# Patient Record
Sex: MALE | Race: WHITE | NOT HISPANIC OR LATINO | URBAN - METROPOLITAN AREA
[De-identification: names, ages, dates, MRNs, and addresses within clinical notes are randomized per-mention and may not be internally consistent; named-entity substitution may affect disease eponyms.]

---

## 2021-01-15 ENCOUNTER — OFFICE VISIT (OUTPATIENT)
Dept: URBAN - METROPOLITAN AREA TELEHEALTH 2 | Facility: TELEHEALTH | Age: 76
End: 2021-01-15
Payer: MEDICARE

## 2021-01-15 DIAGNOSIS — R19.5 LOOSE STOOLS: ICD-10-CM

## 2021-01-15 DIAGNOSIS — K22.70 BARRETT'S ESOPHAGUS WITHOUT DYSPLASIA: ICD-10-CM

## 2021-01-15 PROCEDURE — G8484 FLU IMMUNIZE NO ADMIN: HCPCS | Performed by: NURSE PRACTITIONER

## 2021-01-15 PROCEDURE — G8417 CALC BMI ABV UP PARAM F/U: HCPCS | Performed by: NURSE PRACTITIONER

## 2021-01-15 PROCEDURE — 1036F TOBACCO NON-USER: CPT | Performed by: NURSE PRACTITIONER

## 2021-01-15 PROCEDURE — 99213 OFFICE O/P EST LOW 20 MIN: CPT | Performed by: NURSE PRACTITIONER

## 2021-01-15 PROCEDURE — G9903 PT SCRN TBCO ID AS NON USER: HCPCS | Performed by: NURSE PRACTITIONER

## 2021-01-15 PROCEDURE — G8427 DOCREV CUR MEDS BY ELIG CLIN: HCPCS | Performed by: NURSE PRACTITIONER

## 2021-01-15 RX ORDER — GABAPENTIN 300 MG/1
2 IN PM CAPSULE ORAL
Qty: 0 | Refills: 0 | Status: ACTIVE | COMMUNITY
Start: 1900-01-01

## 2021-01-15 RX ORDER — ATORVASTATIN CALCIUM 10 MG/1
TAKE 1 TABLET (10 MG) BY ORAL ROUTE ONCE DAILY AT BEDTIME TABLET, FILM COATED ORAL 1
Qty: 0 | Refills: 0 | Status: ACTIVE | COMMUNITY
Start: 1900-01-01

## 2021-01-15 RX ORDER — FLUOXETINE HYDROCHLORIDE 10 MG/1
TAKE 1 TABLET (10 MG) BY ORAL ROUTE ONCE DAILY TABLET ORAL 1
Qty: 0 | Refills: 0 | Status: ACTIVE | COMMUNITY
Start: 1900-01-01

## 2021-01-15 RX ORDER — METRONIDAZOLE 500 MG/1
1 TABLET TABLET, FILM COATED ORAL THREE TIMES A DAY
Qty: 30 TABLETS | Refills: 0 | OUTPATIENT
Start: 2021-01-15 | End: 2021-01-25

## 2021-01-15 RX ORDER — ROPINIROLE 2 MG/1
1 IN AFTERNOON AND 1 IN PM TABLET, FILM COATED ORAL
Qty: 0 | Refills: 0 | Status: ACTIVE | COMMUNITY
Start: 1900-01-01

## 2021-01-15 RX ORDER — SACCHAROMYCES BOULARDII 50 MG
1 CAPSULE CAPSULE ORAL TWICE A DAY
Qty: 60 | OUTPATIENT
Start: 2021-01-15 | End: 2021-02-14

## 2021-01-15 NOTE — HPI-OTHER HISTORIES
1/15/20 Mr. Singh presents via [facility] today with complaints of loose stools for the last one year. He may havae 5-6 bowel movements daily. If has to sit down when he goes to the bathroom to urinate as he often will pass at least a small amount of stool. He has significant urgery. No rectal bleeding or black stools. He occasionally will see some mucous. No new medications or antibiotics. No new supplements. Denies fever/chills. No travel. No sick contacts. He does not alternate with constipation. His wife started hiim on an over the counter probiotic about a month ago but he's noticed no significant improvement. He last had colonoscopy 8/13/2018 for personal history of colon polyps and exam was normal. Recommended to repeat in 5 years.   He does have hx of Barretts and last had EGD 8/13/2018 with small islands of probable Barretts at the GE junction confirmed wit Bx. No dysplasia. He is taking pantoprazole 40mg once daily and has no UGI sx.  TG

## 2021-02-22 ENCOUNTER — LAB OUTSIDE AN ENCOUNTER (OUTPATIENT)
Dept: URBAN - NONMETROPOLITAN AREA CLINIC 2 | Facility: CLINIC | Age: 76
End: 2021-02-22

## 2021-02-22 ENCOUNTER — OFFICE VISIT (OUTPATIENT)
Dept: URBAN - NONMETROPOLITAN AREA CLINIC 2 | Facility: CLINIC | Age: 76
End: 2021-02-22
Payer: MEDICARE

## 2021-02-22 DIAGNOSIS — Z86.010 PERSONAL HISTORY OF COLONIC POLYPS: ICD-10-CM

## 2021-02-22 DIAGNOSIS — R19.5 LOOSE STOOLS: ICD-10-CM

## 2021-02-22 DIAGNOSIS — K22.70 BARRETT'S ESOPHAGUS WITHOUT DYSPLASIA: ICD-10-CM

## 2021-02-22 DIAGNOSIS — R19.7 DIARRHEA, UNSPECIFIED TYPE: ICD-10-CM

## 2021-02-22 PROCEDURE — 99214 OFFICE O/P EST MOD 30 MIN: CPT | Performed by: NURSE PRACTITIONER

## 2021-02-22 RX ORDER — GABAPENTIN 300 MG/1
2 IN PM CAPSULE ORAL
Qty: 0 | Refills: 0 | Status: ACTIVE | COMMUNITY
Start: 1900-01-01

## 2021-02-22 RX ORDER — FLUOXETINE HYDROCHLORIDE 10 MG/1
TAKE 1 TABLET (10 MG) BY ORAL ROUTE ONCE DAILY TABLET ORAL 1
Qty: 0 | Refills: 0 | Status: ACTIVE | COMMUNITY
Start: 1900-01-01

## 2021-02-22 RX ORDER — ROPINIROLE 2 MG/1
1 IN AFTERNOON AND 1 IN PM TABLET, FILM COATED ORAL
Qty: 0 | Refills: 0 | Status: ACTIVE | COMMUNITY
Start: 1900-01-01

## 2021-02-22 RX ORDER — ATORVASTATIN CALCIUM 10 MG/1
TAKE 1 TABLET (10 MG) BY ORAL ROUTE ONCE DAILY AT BEDTIME TABLET, FILM COATED ORAL 1
Qty: 0 | Refills: 0 | Status: ACTIVE | COMMUNITY
Start: 1900-01-01

## 2021-02-22 RX ORDER — SODIUM, POTASSIUM,MAG SULFATES 17.5-3.13G
354ML SOLUTION, RECONSTITUTED, ORAL ORAL
Qty: 354 MILLILITER | Refills: 0 | OUTPATIENT
Start: 2021-02-22 | End: 2021-02-23

## 2021-02-22 NOTE — HPI-OTHER HISTORIES
1/15/20 Mr. Singh presents via [facility] today with complaints of loose stools for the last one year. He may havae 5-6 bowel movements daily. If has to sit down when he goes to the bathroom to urinate as he often will pass at least a small amount of stool. He has significant urgery. No rectal bleeding or black stools. He occasionally will see some mucous. No new medications or antibiotics. No new supplements. Denies fever/chills. No travel. No sick contacts. He does not alternate with constipation. His wife started hiim on an over the counter probiotic about a month ago but he's noticed no significant improvement. He last had colonoscopy 8/13/2018 for personal history of colon polyps and exam was normal. Recommended to repeat in 5 years.   He does have hx of Barretts and last had EGD 8/13/2018 with small islands of probable Barretts at the GE junction confirmed wit Bx. No dysplasia. He is taking pantoprazole 40mg once daily and has no UGI sx.  TG  2/22/21 Mr Singh presents for follow up for loose stools/diarrhea. He did complete the Flagyl but this made no difference in his diarrhea/bloating. He continues with 4-5 loose stools most days. He will take two imodium after 2-3 stools and he may have one more and it will subside until the next day. He passes stool when he sits to urinate. He does have urgency occasionally and has had a few episodes of bowel incontinence which is frustrating. He continues on the probiotics with no improvement. No fever/chills/weight loss. He does have some bloating that is uncomfortable but not painful.  He reflux well controlled with pantoprazole twice daily. No dysphagia. TG

## 2021-03-16 ENCOUNTER — CLAIMS CREATED FROM THE CLAIM WINDOW (OUTPATIENT)
Dept: URBAN - METROPOLITAN AREA CLINIC 4 | Facility: CLINIC | Age: 76
End: 2021-03-16
Payer: MEDICARE

## 2021-03-16 ENCOUNTER — OFFICE VISIT (OUTPATIENT)
Dept: URBAN - NONMETROPOLITAN AREA SURGERY CENTER 1 | Facility: SURGERY CENTER | Age: 76
End: 2021-03-16
Payer: MEDICARE

## 2021-03-16 DIAGNOSIS — K22.70 BARRETT'S ESOPHAGUS WITHOUT DYSPLASIA: ICD-10-CM

## 2021-03-16 DIAGNOSIS — K29.60 OTHER GASTRITIS WITHOUT BLEEDING: ICD-10-CM

## 2021-03-16 DIAGNOSIS — K22.70 BARRETT ESOPHAGUS: ICD-10-CM

## 2021-03-16 DIAGNOSIS — D12.5 BENIGN NEOPLASM OF SIGMOID COLON: ICD-10-CM

## 2021-03-16 DIAGNOSIS — K31.89 ISOLATED IDIOPATHIC GRANULOMA OF STOMACH: ICD-10-CM

## 2021-03-16 DIAGNOSIS — D12.5 ADENOMA OF SIGMOID COLON: ICD-10-CM

## 2021-03-16 DIAGNOSIS — K63.89 MASS OF HEPATIC FLEXURE OF COLON: ICD-10-CM

## 2021-03-16 DIAGNOSIS — K31.89 ACQUIRED DEFORMITY OF DUODENUM: ICD-10-CM

## 2021-03-16 DIAGNOSIS — R19.7 ACUTE DIARRHEA: ICD-10-CM

## 2021-03-16 PROCEDURE — 45385 COLONOSCOPY W/LESION REMOVAL: CPT | Performed by: INTERNAL MEDICINE

## 2021-03-16 PROCEDURE — 43239 EGD BIOPSY SINGLE/MULTIPLE: CPT | Performed by: INTERNAL MEDICINE

## 2021-03-16 PROCEDURE — 88312 SPECIAL STAINS GROUP 1: CPT | Performed by: PATHOLOGY

## 2021-03-16 PROCEDURE — 88305 TISSUE EXAM BY PATHOLOGIST: CPT | Performed by: PATHOLOGY

## 2021-03-16 PROCEDURE — 45380 COLONOSCOPY AND BIOPSY: CPT | Performed by: INTERNAL MEDICINE

## 2021-03-16 PROCEDURE — G8907 PT DOC NO EVENTS ON DISCHARG: HCPCS | Performed by: INTERNAL MEDICINE

## 2021-04-28 ENCOUNTER — OFFICE VISIT (OUTPATIENT)
Dept: URBAN - NONMETROPOLITAN AREA CLINIC 2 | Facility: CLINIC | Age: 76
End: 2021-04-28
Payer: MEDICARE

## 2021-04-28 DIAGNOSIS — K22.70 BARRETT'S ESOPHAGUS WITHOUT DYSPLASIA: ICD-10-CM

## 2021-04-28 DIAGNOSIS — Z86.010 PERSONAL HISTORY OF COLONIC POLYPS: ICD-10-CM

## 2021-04-28 DIAGNOSIS — R19.5 LOOSE STOOLS: ICD-10-CM

## 2021-04-28 DIAGNOSIS — K21.9 GERD WITHOUT ESOPHAGITIS: ICD-10-CM

## 2021-04-28 DIAGNOSIS — R19.7 DIARRHEA, UNSPECIFIED TYPE: ICD-10-CM

## 2021-04-28 PROBLEM — 266435005: Status: ACTIVE | Noted: 2021-04-28

## 2021-04-28 PROCEDURE — 99213 OFFICE O/P EST LOW 20 MIN: CPT | Performed by: NURSE PRACTITIONER

## 2021-04-28 RX ORDER — PANTOPRAZOLE SODIUM 40 MG/1
1 TABLET TWICE DAILY 30 MINUTES BEFORE BREAKFAST AND SUPPER TABLET, DELAYED RELEASE ORAL BID
Qty: 180 TABLETS | Refills: 3

## 2021-04-28 RX ORDER — ROPINIROLE 2 MG/1
1 IN AFTERNOON AND 1 IN PM TABLET, FILM COATED ORAL
Qty: 0 | Refills: 0 | Status: ACTIVE | COMMUNITY
Start: 1900-01-01

## 2021-04-28 RX ORDER — GABAPENTIN 300 MG/1
2 IN PM CAPSULE ORAL
Qty: 0 | Refills: 0 | Status: ACTIVE | COMMUNITY
Start: 1900-01-01

## 2021-04-28 RX ORDER — PANTOPRAZOLE SODIUM 40 MG/1
1 TABLET TWICE DAILY 30 MINUTES BEFORE BREAKFAST AND SUPPER TABLET, DELAYED RELEASE ORAL BID
Status: ACTIVE | COMMUNITY

## 2021-04-28 RX ORDER — FLUOXETINE HYDROCHLORIDE 10 MG/1
TAKE 1 TABLET (10 MG) BY ORAL ROUTE ONCE DAILY TABLET ORAL 1
Qty: 0 | Refills: 0 | Status: ACTIVE | COMMUNITY
Start: 1900-01-01

## 2021-04-28 RX ORDER — ATORVASTATIN CALCIUM 10 MG/1
TAKE 1 TABLET (10 MG) BY ORAL ROUTE ONCE DAILY AT BEDTIME TABLET, FILM COATED ORAL 1
Qty: 0 | Refills: 0 | Status: ACTIVE | COMMUNITY
Start: 1900-01-01

## 2021-04-28 NOTE — HPI-OTHER HISTORIES
1/15/20 Mr. Singh presents via  today with complaints of loose stools for the last one year. He may havae 5-6 bowel movements daily. If has to sit down when he goes to the bathroom to urinate as he often will pass at least a small amount of stool. He has significant urgery. No rectal bleeding or black stools. He occasionally will see some mucous. No new medications or antibiotics. No new supplements. Denies fever/chills. No travel. No sick contacts. He does not alternate with constipation. His wife started hiim on an over the counter probiotic about a month ago but he's noticed no significant improvement. He last had colonoscopy 8/13/2018 for personal history of colon polyps and exam was normal. Recommended to repeat in 5 years.   He does have hx of Barretts and last had EGD 8/13/2018 with small islands of probable Barretts at the GE junction confirmed wit Bx. No dysplasia. He is taking pantoprazole 40mg once daily and has no UGI sx.  TG  2/22/21 Mr Singh presents for follow up for loose stools/diarrhea. He did complete the Flagyl but this made no difference in his diarrhea/bloating. He continues with 4-5 loose stools most days. He will take two imodium after 2-3 stools and he may have one more and it will subside until the next day. He passes stool when he sits to urinate. He does have urgency occasionally and has had a few episodes of bowel incontinence which is frustrating. He continues on the probiotics with no improvement. No fever/chills/weight loss. He does have some bloating that is uncomfortable but not painful.  He reflux well controlled with pantoprazole twice daily. No dysphagia. TG   4/28/2021 Mr. Singh presents for follow-up after EGD and colonoscopy 3/16/2021.  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  Colonoscopy with one small TA in the sigmoid colon removed and otherwise normal to the TI.  Random colon biopsies negative for microscopic colitis.  Recommended to repeat EGD and colonoscopy as needed.  Following the colonoscopy, Mr. Singh irregular stools improved.  He has 1-2 bowel movements daily  That are formed.  No abdominal pain.  He is happy with the improvement. He continues on pantoprazole 40mg twice daily with no breakthrough reflux symptoms. Denies dysphagia. No new complaints today.  TG

## 2022-06-30 ENCOUNTER — TELEPHONE ENCOUNTER (OUTPATIENT)
Dept: URBAN - NONMETROPOLITAN AREA CLINIC 2 | Facility: CLINIC | Age: 77
End: 2022-06-30

## 2022-11-10 ENCOUNTER — OFFICE VISIT (OUTPATIENT)
Dept: URBAN - NONMETROPOLITAN AREA CLINIC 2 | Facility: CLINIC | Age: 77
End: 2022-11-10

## 2022-11-28 ENCOUNTER — OFFICE VISIT (OUTPATIENT)
Dept: URBAN - NONMETROPOLITAN AREA CLINIC 2 | Facility: CLINIC | Age: 77
End: 2022-11-28

## 2023-04-06 ENCOUNTER — OFFICE VISIT (OUTPATIENT)
Dept: URBAN - NONMETROPOLITAN AREA CLINIC 13 | Facility: CLINIC | Age: 78
End: 2023-04-06

## 2023-04-27 ENCOUNTER — OFFICE VISIT (OUTPATIENT)
Dept: URBAN - NONMETROPOLITAN AREA CLINIC 13 | Facility: CLINIC | Age: 78
End: 2023-04-27

## 2023-04-27 RX ORDER — CARVEDILOL 6.25 MG/1
TABLET, FILM COATED ORAL
Qty: 180 TABLET | Status: ACTIVE | COMMUNITY

## 2023-04-27 RX ORDER — AMLODIPINE BESYLATE 5 MG/1
TABLET ORAL
Qty: 90 TABLET | Status: ON HOLD | COMMUNITY

## 2023-04-27 RX ORDER — ATORVASTATIN CALCIUM 10 MG/1
TAKE 1 TABLET (10 MG) BY ORAL ROUTE ONCE DAILY AT BEDTIME TABLET, FILM COATED ORAL 1
Qty: 0 | Refills: 0 | Status: ACTIVE | COMMUNITY
Start: 1900-01-01

## 2023-04-27 RX ORDER — FLUOXETINE HYDROCHLORIDE 10 MG/1
TAKE 1 TABLET (10 MG) BY ORAL ROUTE ONCE DAILY TABLET ORAL 1
Qty: 0 | Refills: 0 | Status: ACTIVE | COMMUNITY
Start: 1900-01-01

## 2023-04-27 RX ORDER — LOSARTAN POTASSIUM 100 MG/1
TABLET, FILM COATED ORAL
Qty: 90 TABLET | Status: ACTIVE | COMMUNITY

## 2023-04-27 RX ORDER — PANTOPRAZOLE SODIUM 40 MG/1
1 TABLET TWICE DAILY 30 MINUTES BEFORE BREAKFAST AND SUPPER TABLET, DELAYED RELEASE ORAL BID
Qty: 180 TABLETS | Refills: 3 | Status: ACTIVE | COMMUNITY

## 2023-04-27 RX ORDER — DONEPEZIL HYDROCHLORIDE 10 MG/1
TABLET, FILM COATED ORAL
Qty: 90 TABLET | Status: ACTIVE | COMMUNITY

## 2023-04-27 RX ORDER — GABAPENTIN 300 MG/1
2 IN PM CAPSULE ORAL
Qty: 0 | Refills: 0 | Status: ACTIVE | COMMUNITY
Start: 1900-01-01

## 2023-04-27 RX ORDER — TAMSULOSIN HYDROCHLORIDE 0.4 MG/1
CAPSULE ORAL
Qty: 30 CAPSULE | Status: ACTIVE | COMMUNITY

## 2023-04-27 RX ORDER — ROPINIROLE 2 MG/1
1 IN AFTERNOON AND 1 IN PM TABLET, FILM COATED ORAL
Qty: 0 | Refills: 0 | Status: ACTIVE | COMMUNITY
Start: 1900-01-01

## 2023-04-27 RX ORDER — IPRATROPIUM BROMIDE 21 UG/1
SPRAY, METERED NASAL
Qty: 30 MILLILITER | Status: ACTIVE | COMMUNITY

## 2023-06-01 ENCOUNTER — LAB OUTSIDE AN ENCOUNTER (OUTPATIENT)
Dept: URBAN - NONMETROPOLITAN AREA CLINIC 13 | Facility: CLINIC | Age: 78
End: 2023-06-01

## 2023-06-01 ENCOUNTER — OFFICE VISIT (OUTPATIENT)
Dept: URBAN - NONMETROPOLITAN AREA CLINIC 13 | Facility: CLINIC | Age: 78
End: 2023-06-01
Payer: MEDICARE

## 2023-06-01 VITALS
SYSTOLIC BLOOD PRESSURE: 117 MMHG | HEIGHT: 71 IN | WEIGHT: 188.8 LBS | OXYGEN SATURATION: 100 % | HEART RATE: 84 BPM | DIASTOLIC BLOOD PRESSURE: 73 MMHG | BODY MASS INDEX: 26.43 KG/M2

## 2023-06-01 DIAGNOSIS — K22.70 BARRETT'S ESOPHAGUS WITHOUT DYSPLASIA: ICD-10-CM

## 2023-06-01 DIAGNOSIS — R15.2 FECAL URGENCY: ICD-10-CM

## 2023-06-01 DIAGNOSIS — R19.7 DIARRHEA, UNSPECIFIED TYPE: ICD-10-CM

## 2023-06-01 DIAGNOSIS — Z86.010 PERSONAL HISTORY OF COLONIC POLYPS: ICD-10-CM

## 2023-06-01 DIAGNOSIS — K64.9 HEMORRHOIDS, UNSPECIFIED HEMORRHOID TYPE: ICD-10-CM

## 2023-06-01 PROBLEM — 142251000119102: Status: ACTIVE | Noted: 2023-06-01

## 2023-06-01 PROCEDURE — 99215 OFFICE O/P EST HI 40 MIN: CPT | Performed by: NURSE PRACTITIONER

## 2023-06-01 RX ORDER — CARVEDILOL 6.25 MG/1
TABLET, FILM COATED ORAL
Qty: 180 TABLET | Status: ACTIVE | COMMUNITY

## 2023-06-01 RX ORDER — TAMSULOSIN HYDROCHLORIDE 0.4 MG/1
CAPSULE ORAL
Qty: 30 CAPSULE | Status: ACTIVE | COMMUNITY

## 2023-06-01 RX ORDER — LOSARTAN POTASSIUM 100 MG/1
TABLET, FILM COATED ORAL
Qty: 90 TABLET | Status: ACTIVE | COMMUNITY

## 2023-06-01 RX ORDER — FLUOXETINE HYDROCHLORIDE 10 MG/1
TAKE 1 TABLET (10 MG) BY ORAL ROUTE ONCE DAILY TABLET ORAL 1
Qty: 0 | Refills: 0 | Status: ACTIVE | COMMUNITY
Start: 1900-01-01

## 2023-06-01 RX ORDER — AMLODIPINE BESYLATE 5 MG/1
TABLET ORAL
Qty: 90 TABLET | Status: ON HOLD | COMMUNITY

## 2023-06-01 RX ORDER — IPRATROPIUM BROMIDE 21 UG/1
SPRAY, METERED NASAL
Qty: 30 MILLILITER | Status: ACTIVE | COMMUNITY

## 2023-06-01 RX ORDER — ATORVASTATIN CALCIUM 10 MG/1
TAKE 1 TABLET (10 MG) BY ORAL ROUTE ONCE DAILY AT BEDTIME TABLET, FILM COATED ORAL 1
Qty: 0 | Refills: 0 | Status: ACTIVE | COMMUNITY
Start: 1900-01-01

## 2023-06-01 RX ORDER — PANTOPRAZOLE SODIUM 40 MG/1
1 TABLET TWICE DAILY 30 MINUTES BEFORE BREAKFAST AND SUPPER TABLET, DELAYED RELEASE ORAL BID
Qty: 180 TABLETS | Refills: 3 | Status: ACTIVE | COMMUNITY

## 2023-06-01 RX ORDER — ROPINIROLE 2 MG/1
1 IN AFTERNOON AND 1 IN PM TABLET, FILM COATED ORAL
Qty: 0 | Refills: 0 | Status: ACTIVE | COMMUNITY
Start: 1900-01-01

## 2023-06-01 RX ORDER — PANTOPRAZOLE SODIUM 40 MG/1
1 TABLET TWICE DAILY 30 MINUTES BEFORE BREAKFAST AND SUPPER TABLET, DELAYED RELEASE ORAL TWICE DAILY
Qty: 180 | Refills: 3

## 2023-06-01 RX ORDER — DONEPEZIL HYDROCHLORIDE 10 MG/1
TABLET, FILM COATED ORAL
Qty: 90 TABLET | Status: ACTIVE | COMMUNITY

## 2023-06-01 RX ORDER — GABAPENTIN 300 MG/1
2 IN PM CAPSULE ORAL
Qty: 0 | Refills: 0 | Status: ON HOLD | COMMUNITY
Start: 1900-01-01

## 2023-06-01 NOTE — HPI-TODAY'S VISIT:
Diego Singh is a 77 year old male who presents today for GI follow-up. Mr. Singh has a history of Barretts esophagus with last EGD in 2021. He is on pantoprazole 40 mg daily. He currently endorses cervical dysphagia with breads, meats, and some liquids. He has had a dilation in the paste. No difficulty swallowing pills.  Mr. Joseph also has a history of colon polyps with last colonoscopy 2018 and recommendation to repeat in 5 years. He has had hemorrhoids for most of his life that result in bright red blood on the toilet paper, but he denies any dark or tarry stools, blood in stool, anemia, or pain with hemorrhoids. Diego reports struggling with fecal incontinence and diarrhea. He has at least one bowel movement every day, but it is often small and runny, and he often passes stool when sitting down to urinate.  He sometimes has 3-5 bowel movements/day. He takes immodium as needed, usually ~ 3x/week. Last colonoscopy 3/2021 with 1 TA removed from sigmoid. Similar symptoms resolved after last colonoscopy. LG.

## 2023-08-04 ENCOUNTER — LAB OUTSIDE AN ENCOUNTER (OUTPATIENT)
Dept: URBAN - NONMETROPOLITAN AREA CLINIC 2 | Facility: CLINIC | Age: 78
End: 2023-08-04

## 2023-08-04 ENCOUNTER — OFFICE VISIT (OUTPATIENT)
Dept: URBAN - METROPOLITAN AREA MEDICAL CENTER 1 | Facility: MEDICAL CENTER | Age: 78
End: 2023-08-04
Payer: MEDICARE

## 2023-08-04 DIAGNOSIS — K22.89 OTHER SPECIFIED DISEASE OF ESOPHAGUS: ICD-10-CM

## 2023-08-04 DIAGNOSIS — R15.9 FECAL INCONTINENCE: ICD-10-CM

## 2023-08-04 DIAGNOSIS — K44.9 DIAPHRAGMATIC HERNIA: ICD-10-CM

## 2023-08-04 DIAGNOSIS — K63.89 OTHER SPECIFIED DISEASES OF INTESTINE: ICD-10-CM

## 2023-08-04 PROCEDURE — 45378 DIAGNOSTIC COLONOSCOPY: CPT | Performed by: INTERNAL MEDICINE

## 2023-08-04 PROCEDURE — 43239 EGD BIOPSY SINGLE/MULTIPLE: CPT | Performed by: INTERNAL MEDICINE

## 2023-08-07 LAB
AP CASE REPORT: (no result)
AP FINAL DIAGNOSIS: (no result)
AP GROSS DESCRIPTION: (no result)
AP MICROSCOPIC DESCRIPTION: (no result)

## 2023-09-11 ENCOUNTER — DASHBOARD ENCOUNTERS (OUTPATIENT)
Age: 78
End: 2023-09-11

## 2023-09-11 ENCOUNTER — OFFICE VISIT (OUTPATIENT)
Dept: URBAN - NONMETROPOLITAN AREA CLINIC 2 | Facility: CLINIC | Age: 78
End: 2023-09-11
Payer: MEDICARE

## 2023-09-11 VITALS
HEIGHT: 71 IN | DIASTOLIC BLOOD PRESSURE: 71 MMHG | SYSTOLIC BLOOD PRESSURE: 137 MMHG | BODY MASS INDEX: 25.2 KG/M2 | WEIGHT: 180 LBS | HEART RATE: 58 BPM

## 2023-09-11 DIAGNOSIS — R19.7 ACUTE DIARRHEA: ICD-10-CM

## 2023-09-11 DIAGNOSIS — K64.9 ACUTE HEMORRHOID: ICD-10-CM

## 2023-09-11 DIAGNOSIS — R15.2 FECAL URGENCY: ICD-10-CM

## 2023-09-11 DIAGNOSIS — R13.19 CERVICAL DYSPHAGIA: ICD-10-CM

## 2023-09-11 DIAGNOSIS — K22.70 BARRETT'S ESOPHAGUS WITHOUT DYSPLASIA: ICD-10-CM

## 2023-09-11 PROBLEM — 428283002: Status: ACTIVE | Noted: 2021-01-15

## 2023-09-11 PROBLEM — 71820002: Status: ACTIVE | Noted: 2023-06-01

## 2023-09-11 PROBLEM — 302914006: Status: ACTIVE | Noted: 2021-01-15

## 2023-09-11 PROBLEM — 70153002: Status: ACTIVE | Noted: 2023-06-01

## 2023-09-11 PROBLEM — 62315008: Status: ACTIVE | Noted: 2023-06-01

## 2023-09-11 PROCEDURE — 99214 OFFICE O/P EST MOD 30 MIN: CPT | Performed by: NURSE PRACTITIONER

## 2023-09-11 RX ORDER — PANTOPRAZOLE SODIUM 40 MG/1
1 TABLET TWICE DAILY 30 MINUTES BEFORE BREAKFAST AND SUPPER TABLET, DELAYED RELEASE ORAL TWICE DAILY
Qty: 180 | Refills: 3 | Status: ACTIVE | COMMUNITY

## 2023-09-11 RX ORDER — LOSARTAN POTASSIUM 100 MG/1
TABLET, FILM COATED ORAL
Qty: 90 TABLET | Status: ACTIVE | COMMUNITY

## 2023-09-11 RX ORDER — PANTOPRAZOLE SODIUM 40 MG/1
1 TABLET TWICE DAILY 30 MINUTES BEFORE BREAKFAST AND SUPPER TABLET, DELAYED RELEASE ORAL TWICE DAILY
Qty: 180 | Refills: 3

## 2023-09-11 RX ORDER — IPRATROPIUM BROMIDE 21 UG/1
SPRAY, METERED NASAL
Qty: 30 MILLILITER | Status: ACTIVE | COMMUNITY

## 2023-09-11 RX ORDER — DONEPEZIL HYDROCHLORIDE 10 MG/1
TABLET, FILM COATED ORAL
Qty: 90 TABLET | Status: ACTIVE | COMMUNITY

## 2023-09-11 RX ORDER — ATORVASTATIN CALCIUM 10 MG/1
TAKE 1 TABLET (10 MG) BY ORAL ROUTE ONCE DAILY AT BEDTIME TABLET, FILM COATED ORAL 1
Qty: 0 | Refills: 0 | Status: ACTIVE | COMMUNITY
Start: 1900-01-01

## 2023-09-11 RX ORDER — GABAPENTIN 300 MG/1
2 IN PM CAPSULE ORAL
Qty: 0 | Refills: 0 | Status: ON HOLD | COMMUNITY
Start: 1900-01-01

## 2023-09-11 RX ORDER — FLUOXETINE HYDROCHLORIDE 10 MG/1
TAKE 1 TABLET (10 MG) BY ORAL ROUTE ONCE DAILY TABLET ORAL 1
Qty: 0 | Refills: 0 | Status: ACTIVE | COMMUNITY
Start: 1900-01-01

## 2023-09-11 RX ORDER — CARVEDILOL 6.25 MG/1
TABLET, FILM COATED ORAL
Qty: 180 TABLET | Status: ACTIVE | COMMUNITY

## 2023-09-11 RX ORDER — COLESTIPOL HYDROCHLORIDE 1 G/1
2 TABLETS TABLET, FILM COATED ORAL ONCE A DAY
Qty: 60 | Refills: 5 | OUTPATIENT
Start: 2023-09-11

## 2023-09-11 RX ORDER — AMLODIPINE BESYLATE 5 MG/1
TABLET ORAL
Qty: 90 TABLET | Status: ON HOLD | COMMUNITY

## 2023-09-11 RX ORDER — TAMSULOSIN HYDROCHLORIDE 0.4 MG/1
CAPSULE ORAL
Qty: 30 CAPSULE | Status: ACTIVE | COMMUNITY

## 2023-09-11 RX ORDER — ROPINIROLE 2 MG/1
1 IN AFTERNOON AND 1 IN PM TABLET, FILM COATED ORAL
Qty: 0 | Refills: 0 | Status: ACTIVE | COMMUNITY
Start: 1900-01-01

## 2023-09-11 NOTE — HPI-TODAY'S VISIT:
Mr. Diego Singh returns today for GI follow-up.  He underwent upper endoscopy with biopsies negative for dysplasia or Benites's.  He continues pantoprazole 40 mg twice daily.  He has not noticed any improvement of his cervical dysphagia since upper endoscopy.  He is hesitant to pursue modified barium swallows he feels this is unchanged over the last several years.  He practices swallowing with his chin down which helps to avoid choking and regurgitation.  He also underwent colonoscopy where hemorrhoids and decreased perianal tone were noted.  No polyps were found.  No further screening colons are recommended.  Mr. Singh denies any consistent bleeding.  He continues to take Imodium and psyllium husk every day but still experiences some incontinence.  We discussed starting colestipol may consider Xifaxan at follow-up.  GLENN.

## 2023-09-11 NOTE — HPI-OTHER HISTORIES
1/15/20 Mr. Singh presents via  today with complaints of loose stools for the last one year. He may havae 5-6 bowel movements daily. If has to sit down when he goes to the bathroom to urinate as he often will pass at least a small amount of stool. He has significant urgery. No rectal bleeding or black stools. He occasionally will see some mucous. No new medications or antibiotics. No new supplements. Denies fever/chills. No travel. No sick contacts. He does not alternate with constipation. His wife started hiim on an over the counter probiotic about a month ago but he's noticed no significant improvement. He last had colonoscopy 8/13/2018 for personal history of colon polyps and exam was normal. Recommended to repeat in 5 years. He does have hx of Barretts and last had EGD 8/13/2018 with small islands of probable Barretts at the GE junction confirmed wit Bx. No dysplasia. He is taking pantoprazole 40mg once daily and has no UGI sx. TG  2/22/21 Mr Singh presents for follow up for loose stools/diarrhea. He did complete the Flagyl but this made no difference in his diarrhea/bloating. He continues with 4-5 loose stools most days. He will take two imodium after 2-3 stools and he may have one more and it will subside until the next day. He passes stool when he sits to urinate. He does have urgency occasionally and has had a few episodes of bowel incontinence which is frustrating. He continues on the probiotics with no improvement. No fever/chills/weight loss. He does have some bloating that is uncomfortable but not painful. He reflux well controlled with pantoprazole twice daily. No dysphagia. TG  4/28/2021 Mr. Singh presents for follow-up after EGD and colonoscopy 3/16/2021. 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 Colonoscopy with one small TA in the sigmoid colon removed and otherwise normal to the TI. Random colon biopsies negative for microscopic colitis. Recommended to repeat EGD and colonoscopy as needed. Following the colonoscopy, Mr. Singh irregular stools improved. He has 1-2 bowel movements daily That are formed. No abdominal pain. He is happy with the improvement. He continues on pantoprazole 40mg twice daily with no breakthrough reflux symptoms. Denies dysphagia. No new complaints today. TG 6/2023:  Diego Singh is a 77 year old male who presents today for GI follow-up. Mr. Singh has a history of Barretts esophagus with last EGD in 2021. He is on pantoprazole 40 mg daily. He currently endorses cervical dysphagia with breads, meats, and some liquids. He has had a dilation in the paste. No difficulty swallowing pills. Mr. Singh also has a history of colon polyps with last colonoscopy 2018 and recommendation to repeat in 5 years. He has had hemorrhoids for most of his life that result in bright red blood on the toilet paper, but he denies any dark or tarry stools, blood in stool, anemia, or pain with hemorrhoids. Diego reports struggling with fecal incontinence and diarrhea. He has at least one bowel movement every day, but it is often small and runny, and he often passes stool when sitting down to urinate. He sometimes has 3-5 bowel movements/day. He takes immodium as needed, usually ~ 3x/week. Last colonoscopy 3/2021 with 1 TA removed from sigmoid. Similar symptoms resolved after last colonoscopy. LG.

## 2023-12-04 ENCOUNTER — CLAIMS CREATED FROM THE CLAIM WINDOW (OUTPATIENT)
Dept: URBAN - METROPOLITAN AREA MEDICAL CENTER 1 | Facility: MEDICAL CENTER | Age: 78
End: 2023-12-04
Payer: MEDICARE

## 2023-12-04 DIAGNOSIS — K62.5 ANAL BLEEDING: ICD-10-CM

## 2023-12-04 PROCEDURE — 99232 SBSQ HOSP IP/OBS MODERATE 35: CPT

## 2024-01-29 ENCOUNTER — OFFICE VISIT (OUTPATIENT)
Dept: URBAN - NONMETROPOLITAN AREA CLINIC 2 | Facility: CLINIC | Age: 79
End: 2024-01-29

## 2024-03-11 ENCOUNTER — OV EP (OUTPATIENT)
Dept: URBAN - NONMETROPOLITAN AREA CLINIC 2 | Facility: CLINIC | Age: 79
End: 2024-03-11

## 2025-07-25 ENCOUNTER — OFFICE VISIT (OUTPATIENT)
Dept: URBAN - NONMETROPOLITAN AREA CLINIC 2 | Facility: CLINIC | Age: 80
End: 2025-07-25
Payer: MEDICARE

## 2025-07-25 DIAGNOSIS — I95.9 HYPOTENSION, UNSPECIFIED HYPOTENSION TYPE: ICD-10-CM

## 2025-07-25 DIAGNOSIS — D64.9 ANEMIA, UNSPECIFIED TYPE: ICD-10-CM

## 2025-07-25 DIAGNOSIS — K22.70 BARRETT'S ESOPHAGUS WITHOUT DYSPLASIA: ICD-10-CM

## 2025-07-25 DIAGNOSIS — K64.9 HEMORRHOIDS, UNSPECIFIED HEMORRHOID TYPE: ICD-10-CM

## 2025-07-25 DIAGNOSIS — Z86.0101 PERSONAL HISTORY OF ADENOMATOUS AND SERRATED COLON POLYPS: ICD-10-CM

## 2025-07-25 DIAGNOSIS — R15.2 FECAL URGENCY: ICD-10-CM

## 2025-07-25 DIAGNOSIS — K21.9 GERD WITHOUT ESOPHAGITIS: ICD-10-CM

## 2025-07-25 DIAGNOSIS — R19.7 DIARRHEA, UNSPECIFIED TYPE: ICD-10-CM

## 2025-07-25 DIAGNOSIS — K59.00 CONSTIPATION, UNSPECIFIED CONSTIPATION TYPE: ICD-10-CM

## 2025-07-25 PROCEDURE — 99214 OFFICE O/P EST MOD 30 MIN: CPT

## 2025-07-25 RX ORDER — AMLODIPINE BESYLATE 5 MG/1
TABLET ORAL
Qty: 90 TABLET | Status: ON HOLD | COMMUNITY

## 2025-07-25 RX ORDER — IPRATROPIUM BROMIDE 21 UG/1
SPRAY, METERED NASAL
Qty: 30 MILLILITER | Status: ACTIVE | COMMUNITY

## 2025-07-25 RX ORDER — ROPINIROLE 2 MG/1
1 IN AFTERNOON AND 1 IN PM TABLET, FILM COATED ORAL
Qty: 0 | Refills: 0 | Status: ACTIVE | COMMUNITY
Start: 1900-01-01

## 2025-07-25 RX ORDER — TAMSULOSIN HYDROCHLORIDE 0.4 MG/1
CAPSULE ORAL
Qty: 30 CAPSULE | Status: ACTIVE | COMMUNITY

## 2025-07-25 RX ORDER — CARVEDILOL 6.25 MG/1
TABLET, FILM COATED ORAL
Qty: 180 TABLET | Status: ACTIVE | COMMUNITY

## 2025-07-25 RX ORDER — FLUOXETINE HYDROCHLORIDE 10 MG/1
TAKE 1 TABLET (10 MG) BY ORAL ROUTE ONCE DAILY TABLET ORAL 1
Qty: 0 | Refills: 0 | Status: ACTIVE | COMMUNITY
Start: 1900-01-01

## 2025-07-25 RX ORDER — ATORVASTATIN CALCIUM 10 MG/1
TAKE 1 TABLET (10 MG) BY ORAL ROUTE ONCE DAILY AT BEDTIME TABLET, FILM COATED ORAL 1
Qty: 0 | Refills: 0 | Status: ACTIVE | COMMUNITY
Start: 1900-01-01

## 2025-07-25 RX ORDER — PANTOPRAZOLE SODIUM 40 MG/1
1 TABLET TWICE DAILY 30 MINUTES BEFORE BREAKFAST AND SUPPER TABLET, DELAYED RELEASE ORAL TWICE DAILY
Qty: 180 | Refills: 3 | Status: ACTIVE | COMMUNITY

## 2025-07-25 RX ORDER — GABAPENTIN 300 MG/1
2 IN PM CAPSULE ORAL
Qty: 0 | Refills: 0 | Status: ON HOLD | COMMUNITY
Start: 1900-01-01

## 2025-07-25 RX ORDER — PANTOPRAZOLE SODIUM 40 MG/1
1 TABLET TABLET, DELAYED RELEASE ORAL ONCE A DAY
Qty: 90 TABLET | Refills: 3 | OUTPATIENT
Start: 2025-07-25

## 2025-07-25 RX ORDER — MIDODRINE HYDROCHLORIDE 5 MG/1
1 TABLET TABLET ORAL TWICE A DAY
Status: ACTIVE | COMMUNITY

## 2025-07-25 RX ORDER — LOSARTAN POTASSIUM 100 MG/1
TABLET, FILM COATED ORAL
Qty: 90 TABLET | Status: ACTIVE | COMMUNITY

## 2025-07-25 RX ORDER — COLESTIPOL HYDROCHLORIDE 1 G/1
2 TABLETS TABLET, FILM COATED ORAL ONCE A DAY
Qty: 60 | Refills: 5 | Status: ACTIVE | COMMUNITY
Start: 2023-09-11

## 2025-07-25 RX ORDER — DONEPEZIL HYDROCHLORIDE 10 MG/1
TABLET, FILM COATED ORAL
Qty: 90 TABLET | Status: ACTIVE | COMMUNITY

## 2025-07-25 NOTE — HPI-TODAY'S VISIT:
1/15/20 Mr. Singh presents via  today with complaints of loose stools for the last one year. He may havae 5-6 bowel movements daily. If has to sit down when he goes to the bathroom to urinate as he often will pass at least a small amount of stool. He has significant urgery. No rectal bleeding or black stools. He occasionally will see some mucous. No new medications or antibiotics. No new supplements. Denies fever/chills. No travel. No sick contacts. He does not alternate with constipation. His wife started hiim on an over the counter probiotic about a month ago but he's noticed no significant improvement. He last had colonoscopy 8/13/2018 for personal history of colon polyps and exam was normal. Recommended to repeat in 5 years. He does have hx of Barretts and last had EGD 8/13/2018 with small islands of probable Barretts at the GE junction confirmed wit Bx. No dysplasia. He is taking pantoprazole 40mg once daily and has no UGI sx. TG  2/22/21 Mr Singh presents for follow up for loose stools/diarrhea. He did complete the Flagyl but this made no difference in his diarrhea/bloating. He continues with 4-5 loose stools most days. He will take two imodium after 2-3 stools and he may have one more and it will subside until the next day. He passes stool when he sits to urinate. He does have urgency occasionally and has had a few episodes of bowel incontinence which is frustrating. He continues on the probiotics with no improvement. No fever/chills/weight loss. He does have some bloating that is uncomfortable but not painful. He reflux well controlled with pantoprazole twice daily. No dysphagia. TG  4/28/2021 Mr. Singh presents for follow-up after EGD and colonoscopy 3/16/2021. 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 Colonoscopy with one small TA in the sigmoid colon removed and otherwise normal to the TI. Random colon biopsies negative for microscopic colitis. Recommended to repeat EGD and colonoscopy as needed. Following the colonoscopy, Mr. Singh irregular stools improved. He has 1-2 bowel movements daily That are formed. No abdominal pain. He is happy with the improvement. He continues on pantoprazole 40mg twice daily with no breakthrough reflux symptoms. Denies dysphagia. No new complaints today. TG  6/2023: Diego Singh is a 77 year old male who presents today for GI follow-up. Mr. Singh has a history of Barretts esophagus with last EGD in 2021. He is on pantoprazole 40 mg daily. He currently endorses cervical dysphagia with breads, meats, and some liquids. He has had a dilation in the paste. No difficulty swallowing pills. Mr. Singh also has a history of colon polyps with last colonoscopy 2018 and recommendation to repeat in 5 years. He has had hemorrhoids for most of his life that result in bright red blood on the toilet paper, but he denies any dark or tarry stools, blood in stool, anemia, or pain with hemorrhoids. Diego reports struggling with fecal incontinence and diarrhea. He has at least one bowel movement every day, but it is often small and runny, and he often passes stool when sitting down to urinate. He sometimes has 3-5 bowel movements/day. He takes immodium as needed, usually ~ 3x/week. Last colonoscopy 3/2021 with 1 TA removed from sigmoid. Similar symptoms resolved after last colonoscopy. LG  9/11/2023: Mr. Diego Singh returns today for GI follow-up.  He underwent upper endoscopy with biopsies negative for dysplasia or Benites's.  He continues pantoprazole 40 mg twice daily.  He has not noticed any improvement of his cervical dysphagia since upper endoscopy.  He is hesitant to pursue modified barium swallows he feels this is unchanged over the last several years.  He practices swallowing with his chin down which helps to avoid choking and regurgitation.  He also underwent colonoscopy where hemorrhoids and decreased perianal tone were noted.  No polyps were found.  No further screening colons are recommended.  Mr. Singh denies any consistent bleeding.  He continues to take Imodium and psyllium husk every day but still experiences some incontinence.  We discussed starting colestipol may consider Xifaxan at follow-up.  LG.  7/25/2025: Diego Singh, an 79-year-old male, presented for further evaluaton of constipation. Previously, he experienced unpredictable diarrhea managed with Imodium, but since starting dialysis and new medications, he now feels more constipated. He reported only one bowel movement in the last two days after starting Lactulose, and continues to use Colace and Miralax as part of his regimen. He states that he goes maybe twice a week on this regimen. For gastroesophageal reflux disease, he has been on pantoprazole 40 mg twice daily for years and is willing to try reducing the dose to once daily. He had an endoscopy in 2023 that did not reveal Benites's on path but he did have it evident on the prior egd in 2021. Also, he reported feeling dizzy and weak when he got to the clinic. He reported significant fluctuations in his blood pressure, with episodes as low as 85/80 and as high as 200, expressing concern about these changes. He takes his blood pressure medications as prescribed. HJ

## 2025-07-26 LAB
A/G RATIO: 1.7
ABSOLUTE BASOPHILS: 61
ABSOLUTE EOSINOPHILS: 250
ABSOLUTE LYMPHOCYTES: 1348
ABSOLUTE MONOCYTES: 390
ABSOLUTE NEUTROPHILS: 4050
ALBUMIN: 3.8
ALKALINE PHOSPHATASE: 107
ALT (SGPT): 8
AST (SGOT): 18
BASOPHILS: 1
BILIRUBIN, TOTAL: 0.4
BUN/CREATININE RATIO: 4
BUN: 16
CALCIUM: 7.8
CARBON DIOXIDE, TOTAL: 29
CHLORIDE: 101
CREATININE: 3.96
EGFR: 15
EOSINOPHILS: 4.1
GLOBULIN, TOTAL: 2.2
GLUCOSE: 104
HEMATOCRIT: 33.4
HEMOGLOBIN: 10.7
IRON BIND.CAP.(TIBC): 260
IRON SATURATION: 28
IRON: 73
LYMPHOCYTES: 22.1
MCH: 30.4
MCHC: 32
MCV: 94.9
MONOCYTES: 6.4
MPV: 12.2
NEUTROPHILS: 66.4
PLATELET COUNT: 141
POTASSIUM: 3.8
PROTEIN, TOTAL: 6
RDW: 12.9
RED BLOOD CELL COUNT: 3.52
SODIUM: 140
WHITE BLOOD CELL COUNT: 6.1

## 2025-07-31 ENCOUNTER — TELEPHONE ENCOUNTER (OUTPATIENT)
Dept: URBAN - NONMETROPOLITAN AREA CLINIC 2 | Facility: CLINIC | Age: 80
End: 2025-07-31

## 2025-07-31 PROBLEM — 398032003: Status: ACTIVE | Noted: 2025-07-25

## 2025-07-31 PROBLEM — 14760008: Status: ACTIVE | Noted: 2025-07-25

## 2025-07-31 PROBLEM — 271737000: Status: ACTIVE | Noted: 2025-07-25

## 2025-07-31 PROBLEM — 45007003: Status: ACTIVE | Noted: 2025-07-25

## 2025-08-13 ENCOUNTER — TELEPHONE ENCOUNTER (OUTPATIENT)
Dept: URBAN - NONMETROPOLITAN AREA CLINIC 2 | Facility: CLINIC | Age: 80
End: 2025-08-13

## 2025-08-25 ENCOUNTER — OFFICE VISIT (OUTPATIENT)
Age: 80
End: 2025-08-25
Payer: MEDICARE

## 2025-08-25 DIAGNOSIS — K21.9 GERD WITHOUT ESOPHAGITIS: ICD-10-CM

## 2025-08-25 DIAGNOSIS — R15.2 FECAL URGENCY: ICD-10-CM

## 2025-08-25 DIAGNOSIS — R19.7 DIARRHEA, UNSPECIFIED TYPE: ICD-10-CM

## 2025-08-25 DIAGNOSIS — D64.9 ANEMIA, UNSPECIFIED TYPE: ICD-10-CM

## 2025-08-25 DIAGNOSIS — K64.9 HEMORRHOIDS, UNSPECIFIED HEMORRHOID TYPE: ICD-10-CM

## 2025-08-25 DIAGNOSIS — K22.70 BARRETT'S ESOPHAGUS WITHOUT DYSPLASIA: ICD-10-CM

## 2025-08-25 DIAGNOSIS — Z86.0101 PERSONAL HISTORY OF ADENOMATOUS AND SERRATED COLON POLYPS: ICD-10-CM

## 2025-08-25 DIAGNOSIS — K59.00 CONSTIPATION, UNSPECIFIED CONSTIPATION TYPE: ICD-10-CM

## 2025-08-25 PROCEDURE — 99214 OFFICE O/P EST MOD 30 MIN: CPT

## 2025-08-25 RX ORDER — MIDODRINE HYDROCHLORIDE 5 MG/1
1 TABLET TABLET ORAL TWICE A DAY
Status: ACTIVE | COMMUNITY

## 2025-08-25 RX ORDER — TAMSULOSIN HYDROCHLORIDE 0.4 MG/1
CAPSULE ORAL
Qty: 30 CAPSULE | Status: ACTIVE | COMMUNITY

## 2025-08-25 RX ORDER — CARVEDILOL 6.25 MG/1
TABLET, FILM COATED ORAL
Qty: 180 TABLET | Status: ACTIVE | COMMUNITY

## 2025-08-25 RX ORDER — LOSARTAN POTASSIUM 100 MG/1
TABLET, FILM COATED ORAL
Qty: 90 TABLET | Status: ACTIVE | COMMUNITY

## 2025-08-25 RX ORDER — PANTOPRAZOLE SODIUM 40 MG/1
1 TABLET TWICE DAILY 30 MINUTES BEFORE BREAKFAST AND SUPPER TABLET, DELAYED RELEASE ORAL TWICE DAILY
Qty: 180 | Refills: 3 | Status: ACTIVE | COMMUNITY

## 2025-08-25 RX ORDER — ATORVASTATIN CALCIUM 10 MG/1
TAKE 1 TABLET (10 MG) BY ORAL ROUTE ONCE DAILY AT BEDTIME TABLET, FILM COATED ORAL 1
Qty: 0 | Refills: 0 | Status: ACTIVE | COMMUNITY
Start: 1900-01-01

## 2025-08-25 RX ORDER — DONEPEZIL HYDROCHLORIDE 10 MG/1
TABLET, FILM COATED ORAL
Qty: 90 TABLET | Status: ACTIVE | COMMUNITY

## 2025-08-25 RX ORDER — GABAPENTIN 300 MG/1
2 IN PM CAPSULE ORAL
Qty: 0 | Refills: 0 | Status: ON HOLD | COMMUNITY
Start: 1900-01-01

## 2025-08-25 RX ORDER — AMLODIPINE BESYLATE 5 MG/1
TABLET ORAL
Qty: 90 TABLET | Status: ON HOLD | COMMUNITY

## 2025-08-25 RX ORDER — ROPINIROLE 2 MG/1
1 IN AFTERNOON AND 1 IN PM TABLET, FILM COATED ORAL
Qty: 0 | Refills: 0 | Status: ACTIVE | COMMUNITY
Start: 1900-01-01

## 2025-08-25 RX ORDER — FLUOXETINE HYDROCHLORIDE 10 MG/1
TAKE 1 TABLET (10 MG) BY ORAL ROUTE ONCE DAILY TABLET ORAL 1
Qty: 0 | Refills: 0 | Status: ACTIVE | COMMUNITY
Start: 1900-01-01

## 2025-08-25 RX ORDER — IPRATROPIUM BROMIDE 21 UG/1
SPRAY, METERED NASAL
Qty: 30 MILLILITER | Status: ACTIVE | COMMUNITY

## 2025-08-25 RX ORDER — PANTOPRAZOLE SODIUM 40 MG/1
1 TABLET TABLET, DELAYED RELEASE ORAL ONCE A DAY
Qty: 90 TABLET | Refills: 3 | Status: ACTIVE | COMMUNITY
Start: 2025-07-25

## 2025-08-25 RX ORDER — COLESTIPOL HYDROCHLORIDE 1 G/1
2 TABLETS TABLET, FILM COATED ORAL ONCE A DAY
Qty: 60 | Refills: 5 | Status: ACTIVE | COMMUNITY
Start: 2023-09-11